# Patient Record
Sex: MALE | Race: WHITE | URBAN - NONMETROPOLITAN AREA
[De-identification: names, ages, dates, MRNs, and addresses within clinical notes are randomized per-mention and may not be internally consistent; named-entity substitution may affect disease eponyms.]

---

## 2022-10-17 ENCOUNTER — HOSPITAL ENCOUNTER (EMERGENCY)
Age: 22
Discharge: HOME | End: 2022-10-18
Attending: EMERGENCY MEDICINE
Payer: COMMERCIAL

## 2022-10-17 DIAGNOSIS — Z78.9 ALCOHOL USE: ICD-10-CM

## 2022-10-17 DIAGNOSIS — T14.91XA SUICIDE ATTEMPT (HCC): Primary | ICD-10-CM

## 2022-10-17 DIAGNOSIS — T58.92XA: ICD-10-CM

## 2022-10-17 LAB
AMPHETAMINE SCREEN URINE: NEGATIVE
BARBITURATE SCREEN URINE: NEGATIVE
BENZODIAZEPINE SCREEN, URINE: NEGATIVE
BUPRENORPHINE URINE: NEGATIVE
CANNABINOID SCREEN URINE: NEGATIVE
CARBOXYHEMOGLOBIN: 3.6 % (ref 0–5)
CARBOXYHEMOGLOBIN: 9.5 % (ref 0–5)
COCAINE METABOLITE, URINE: NEGATIVE
FIO2: 100
HCO3 VENOUS: 27.3 MMOL/L (ref 24–30)
HCT VFR BLD CALC: 50.2 % (ref 40.7–50.3)
HEMOGLOBIN: 17.6 G/DL (ref 13–17)
MCH RBC QN AUTO: 32.2 PG (ref 25.2–33.5)
MCHC RBC AUTO-ENTMCNC: 35.1 G/DL (ref 28.4–34.8)
MCV RBC AUTO: 91.8 FL (ref 82.6–102.9)
METHADONE SCREEN, URINE: NEGATIVE
METHAMPHETAMINE, URINE: NEGATIVE
METHEMOGLOBIN: 0.4 % (ref 0–1.9)
NRBC AUTOMATED: 0 PER 100 WBC
O2 DEVICE/FLOW/%: ABNORMAL
O2 SAT, VEN: 66 % (ref 60–85)
OPIATES, URINE: NEGATIVE
OXYCODONE SCREEN URINE: NEGATIVE
PATIENT TEMP: 37
PCO2, VEN: 44.1 MM HG (ref 39–55)
PDW BLD-RTO: 11.2 % (ref 11.8–14.4)
PH VENOUS: 7.41 (ref 7.32–7.42)
PHENCYCLIDINE, URINE: NEGATIVE
PLATELET # BLD: 332 K/UL (ref 138–453)
PMV BLD AUTO: 9 FL (ref 8.1–13.5)
PO2, VEN: 29.7 MM HG (ref 30–50)
POSITIVE BASE EXCESS, VEN: 2.1 MMOL/L (ref 0–2)
PROPOXYPHENE, URINE: NEGATIVE
RBC # BLD: 5.47 M/UL (ref 4.21–5.77)
TRICYCLIC ANTIDEPRESSANTS, UR: NEGATIVE
WBC # BLD: 7.5 K/UL (ref 3.5–11.3)

## 2022-10-17 PROCEDURE — G0480 DRUG TEST DEF 1-7 CLASSES: HCPCS

## 2022-10-17 PROCEDURE — 99285 EMERGENCY DEPT VISIT HI MDM: CPT

## 2022-10-17 PROCEDURE — 80306 DRUG TEST PRSMV INSTRMNT: CPT

## 2022-10-17 PROCEDURE — 36415 COLL VENOUS BLD VENIPUNCTURE: CPT

## 2022-10-17 PROCEDURE — 80143 DRUG ASSAY ACETAMINOPHEN: CPT

## 2022-10-17 PROCEDURE — 88740 TRANSCUTANEOUS CARBOXYHB: CPT

## 2022-10-17 PROCEDURE — 85027 COMPLETE CBC AUTOMATED: CPT

## 2022-10-17 PROCEDURE — 82375 ASSAY CARBOXYHB QUANT: CPT

## 2022-10-17 PROCEDURE — 82805 BLOOD GASES W/O2 SATURATION: CPT

## 2022-10-17 PROCEDURE — 93005 ELECTROCARDIOGRAM TRACING: CPT | Performed by: EMERGENCY MEDICINE

## 2022-10-17 PROCEDURE — 80179 DRUG ASSAY SALICYLATE: CPT

## 2022-10-17 PROCEDURE — 80307 DRUG TEST PRSMV CHEM ANLYZR: CPT

## 2022-10-17 ASSESSMENT — PATIENT HEALTH QUESTIONNAIRE - PHQ9: SUM OF ALL RESPONSES TO PHQ QUESTIONS 1-9: 20

## 2022-10-17 ASSESSMENT — PAIN DESCRIPTION - DESCRIPTORS: DESCRIPTORS: ACHING

## 2022-10-17 ASSESSMENT — PAIN DESCRIPTION - LOCATION: LOCATION: HEAD

## 2022-10-17 ASSESSMENT — PAIN SCALES - GENERAL: PAINLEVEL_OUTOF10: 2

## 2022-10-17 ASSESSMENT — PAIN - FUNCTIONAL ASSESSMENT: PAIN_FUNCTIONAL_ASSESSMENT: 0-10

## 2022-10-17 NOTE — ED PROVIDER NOTES
[facility] ED  Emergency Department        Pt Name: Vesta Kaufman  MRN: [id number]  [place] 2000  Date of evaluation: 10/17/22    CHIEF COMPLAINT       Chief Complaint   Patient presents with    Suicide Attempt     Pt reports SI with attempt prior to arrival. Reports sitting in garage with car running x 1-2 hours. States this happened around 1400. HISTORY OF PRESENT ILLNESS  (Location/Symptom, Timing/Onset, Context/Setting, Quality, Duration, ModifyingFactors, Severity.)      Vesta Kaufman is a 20 y.o. male who presents with suicide attempt. Patient states that he was sitting in a 1 car garage with the door shocked with the car running for 1 hour to 90- minutes. Was found by family. Patient does report crying. He does have a slight headache was brought in by family. Does have a history of suicidal ideation but no attempts previously is not on any medication. PAST MEDICAL / SURGICAL / SOCIAL / FAMILY HISTORY      has a past medical history of Allergic rhinitis and Laceration of right thumb.     has a past surgical history that includes Tonsillectomy.        Social History     Socioeconomic History    Marital status: Single     Spouse name: Not on file    Number of children: Not on file    Years of education: Not on file    Highest education level: Not on file   Occupational History    Not on file   Tobacco Use    Smoking status: Never    Smokeless tobacco: Not on file   Vaping Use    Vaping Use: Every day    Start date: 1/2/2019    Substances: Nicotine    Devices: Disposable   Substance and Sexual Activity    Alcohol use: Not on file    Drug use: Not on file    Sexual activity: Not on file   Other Topics Concern    Not on file   Social History Narrative    Not on file     Social Determinants of Health     Financial Resource Strain: Not on file   Food Insecurity: Not on file   Transportation Needs: Not on file   Physical Activity: Not on file   Stress: Not on file   Social Connections: Not on file   Intimate Partner Violence: Not on file   Housing Stability: Not on file       History reviewed. No pertinent family history. Allergies:  Patient has no known allergies. Home Medications:  Prior to Admission medications    Medication Sig Start Date End Date Taking? Authorizing Provider   gentamicin (GARAMYCIN) 0.3 % ophthalmic solution 2 drops to wound area(s) 2x / day  Patient not taking: Reported on 10/17/2022 12/29/15   Gomez Close, DPM       REVIEW OF SYSTEMS    (2-9 systems for level 4, 10 or more for level 5)      Review of Systems   Constitutional:  Negative for activity change, appetite change, fatigue and fever. HENT:  Negative for congestion, rhinorrhea and sore throat. Respiratory:  Negative for cough, shortness of breath and wheezing. Cardiovascular:  Negative for chest pain, palpitations and leg swelling. Gastrointestinal:  Negative for abdominal distention, constipation, diarrhea, nausea and vomiting. Genitourinary:  Negative for decreased urine volume and dysuria. Skin:  Negative for rash. Neurological:  Negative for dizziness, weakness, light-headedness, numbness and headaches. Psychiatric/Behavioral:  Positive for suicidal ideas. PHYSICAL EXAM   (up to 7 for level 4, 8 or more for level 5)     INITIAL VITALS:   /71   Pulse 58   Temp 99.5 °F (37.5 °C) (Tympanic)   Resp 16   Ht 6' (1.829 m)   Wt 185 lb (83.9 kg)   SpO2 98%   BMI 25.09 kg/m²     Physical Exam  Constitutional:       General: He is not in acute distress. Appearance: Normal appearance. He is not ill-appearing. Comments: Patient is awake alert expresses suicidal ideation with intent, he is mentating appropriately, negative cycloometric testing. He does have some erythema noted to his superior and inferior eyelid and some conjunctival injection. Patient does report he has been crying   HENT:      Head: Normocephalic and atraumatic.    Eyes:      General: Right eye: No discharge. Left eye: No discharge. Extraocular Movements: Extraocular movements intact. Pupils: Pupils are equal, round, and reactive to light. Cardiovascular:      Rate and Rhythm: Normal rate. Pulmonary:      Effort: Pulmonary effort is normal. No respiratory distress. Musculoskeletal:      Right lower leg: No edema. Left lower leg: No edema. Neurological:      General: No focal deficit present. Mental Status: He is alert and oriented to person, place, and time. DIFFERENTIAL  DIAGNOSIS     Patient awake alert, does report suicidal attempt, with sitting in the garage. We will plan on cooximeter which was done which does show 11 initially we will plan on formal blood test.  Placed on nonrebreather and monitor for clearance. Patient will need admission to psychiatric facility once medically stable    PLAN (LABS / IMAGING / EKG):  Orders Placed This Encounter   Procedures    COVID-19, Rapid    CBC    Blood gas, venous    Carboxyhemoglobin    TOX SCR, BLD, ED    Drug screen multi urine    Carboxyhemoglobin    EKG 12 Lead    Insert peripheral IV       MEDICATIONS ORDERED:  No orders of the defined types were placed in this encounter. DIAGNOSTIC RESULTS / EMERGENCY DEPARTMENT COURSE / MDM     LABS:  Results for orders placed or performed during the hospital encounter of 10/17/22   COVID-19, Rapid    Specimen: Nasopharyngeal Swab   Result Value Ref Range    Specimen Description . NASOPHARYNGEAL SWAB     SARS-CoV-2, Rapid Not Detected Not Detected   CBC   Result Value Ref Range    WBC 7.5 3.5 - 11.3 k/uL    RBC 5.47 4.21 - 5.77 m/uL    Hemoglobin 17.6 (H) 13.0 - 17.0 g/dL    Hematocrit 50.2 40.7 - 50.3 %    MCV 91.8 82.6 - 102.9 fL    MCH 32.2 25.2 - 33.5 pg    MCHC 35.1 (H) 28.4 - 34.8 g/dL    RDW 11.2 (L) 11.8 - 14.4 %    Platelets 418 438 - 580 k/uL    MPV 9.0 8.1 - 13.5 fL    NRBC Automated 0.0 0.0 per 100 WBC   Blood gas, venous   Result Value Ref Range pH, De 7.410 7.32 - 7.42    pCO2, De 44.1 39 - 55 mm Hg    pO2, De 29.7 (L) 30.0 - 50.0 mm Hg    HCO3, Venous 27.3 24.0 - 30.0 mmol/L    Positive Base Excess, De 2.1 (H) 0.0 - 2.0 mmol/L    O2 Sat, De 66.0 60.0 - 85.0 %    Carboxyhemoglobin 9.5 (H) 0.0 - 5.0 %    Methemoglobin 0.4 0.0 - 1.9 %    Pt Temp 37.0     O2 Device/Flow/% O2 Mask     FIO2 100    TOX SCR, BLD, ED   Result Value Ref Range    Acetaminophen Level <5 (L) 10 - 30 ug/mL    Ethanol 76 (H) <10 mg/dL    Ethanol percent 0.076 (H) <9.274 %    Salicylate Lvl <1 (L) 3 - 10 mg/dL    Toxic Tricyclic Sc,Blood PENDING NEGATIVE   Drug screen multi urine   Result Value Ref Range    Amphetamine Screen, Ur NEGATIVE NEGATIVE    Barbiturate Screen, Ur NEGATIVE NEGATIVE    Benzodiazepine Screen, Urine NEGATIVE     Cocaine Metabolite, Urine NEGATIVE NEGATIVE    Methadone Screen, Urine NEGATIVE NEGATIVE    Opiates, Urine NEGATIVE NEGATIVE    Phencyclidine, Urine NEGATIVE NEGATIVE    Propoxyphene, Urine NEGATIVE NEGATIVE    Cannabinoid Scrn, Ur NEGATIVE NEGATIVE    Oxycodone Screen, Ur NEGATIVE NEGATIVE    Methamphetamine, Urine NEGATIVE NEGATIVE    Tricyclic Antidepressants, Urine NEGATIVE NEGATIVE    Buprenorphine Urine NEGATIVE NEGATIVE   Carboxyhemoglobin   Result Value Ref Range    Carboxyhemoglobin 3.6 0 - 5 %   EKG 12 Lead   Result Value Ref Range    Ventricular Rate 87 BPM    Atrial Rate 87 BPM    P-R Interval 154 ms    QRS Duration 88 ms    Q-T Interval 352 ms    QTc Calculation (Bazett) 423 ms    P Axis 69 degrees    R Axis 70 degrees    T Axis 44 degrees       IMPRESSION: carbon monoxide posioning  Suicide attempt      EMERGENCY DEPARTMENT COURSE:  Patient medically stable for transfer to psychiatric facility, pink slip applied. Carbon monoxide level down to 6. Patient signed out to oncoming physician. FINAL IMPRESSION      1. Suicide attempt ([facility] [address]    2. Toxic effect of carbon monoxide, intentional self-harm, initial encounter ([facility] [address]    3. Alcohol use          DISPOSITION / PLAN     DISPOSITION Decision To Transfer 10/17/2022 07:24:25 PM      PATIENT REFERRED TO:  No follow-up provider specified.     DISCHARGE MEDICATIONS:  Discharge Medication List as of 10/18/2022  4:14 AM          Omaira Gilmore DO  7:55 AM    Attending Emergency Physician  [address] [place] [address] ED    (Please note that portions of this note were completed with a voice recognition program.  Effortswere made to edit the dictations but occasionally words are mis-transcribed.)             Shirley Fritz DO  10/18/22 5217

## 2022-10-18 ENCOUNTER — HOSPITAL ENCOUNTER (INPATIENT)
Age: 22
LOS: 3 days | Discharge: HOME | DRG: 885 | End: 2022-10-21
Attending: PSYCHIATRY & NEUROLOGY | Admitting: PSYCHIATRY & NEUROLOGY
Payer: COMMERCIAL

## 2022-10-18 VITALS
BODY MASS INDEX: 25.06 KG/M2 | HEART RATE: 58 BPM | HEIGHT: 72 IN | OXYGEN SATURATION: 98 % | WEIGHT: 185 LBS | DIASTOLIC BLOOD PRESSURE: 71 MMHG | TEMPERATURE: 99.5 F | RESPIRATION RATE: 16 BRPM | SYSTOLIC BLOOD PRESSURE: 110 MMHG

## 2022-10-18 PROBLEM — R45.851 DEPRESSION WITH SUICIDAL IDEATION: Status: ACTIVE | Noted: 2022-10-18

## 2022-10-18 PROBLEM — F32.2 MAJOR DEPRESSIVE DISORDER, SINGLE EPISODE, SEVERE WITHOUT PSYCHOTIC FEATURES (HCC): Status: ACTIVE | Noted: 2022-10-18

## 2022-10-18 PROBLEM — F32.A DEPRESSION WITH SUICIDAL IDEATION: Status: ACTIVE | Noted: 2022-10-18

## 2022-10-18 LAB
ACETAMINOPHEN LEVEL: <5 UG/ML (ref 10–30)
ALBUMIN SERPL-MCNC: 4.9 G/DL (ref 3.5–5.2)
ALP BLD-CCNC: 95 U/L (ref 40–129)
ALT SERPL-CCNC: 25 U/L (ref 5–41)
ANION GAP SERPL CALCULATED.3IONS-SCNC: 14 MMOL/L (ref 9–17)
AST SERPL-CCNC: 19 U/L
BILIRUB SERPL-MCNC: 1.7 MG/DL (ref 0.3–1.2)
BUN BLDV-MCNC: 9 MG/DL (ref 6–20)
CALCIUM SERPL-MCNC: 9.1 MG/DL (ref 8.6–10.4)
CHLORIDE BLD-SCNC: 101 MMOL/L (ref 98–107)
CO2: 23 MMOL/L (ref 20–31)
CREAT SERPL-MCNC: 0.91 MG/DL (ref 0.7–1.2)
EKG ATRIAL RATE: 87 BPM
EKG P AXIS: 69 DEGREES
EKG P-R INTERVAL: 154 MS
EKG Q-T INTERVAL: 352 MS
EKG QRS DURATION: 88 MS
EKG QTC CALCULATION (BAZETT): 423 MS
EKG R AXIS: 70 DEGREES
EKG T AXIS: 44 DEGREES
EKG VENTRICULAR RATE: 87 BPM
ETHANOL PERCENT: 0.08 %
ETHANOL: 76 MG/DL
GFR SERPL CREATININE-BSD FRML MDRD: >60 ML/MIN/1.73M2
GLUCOSE BLD-MCNC: 86 MG/DL (ref 70–99)
POTASSIUM SERPL-SCNC: 4.1 MMOL/L (ref 3.7–5.3)
SALICYLATE LEVEL: <1 MG/DL (ref 3–10)
SARS-COV-2, RAPID: NOT DETECTED
SODIUM BLD-SCNC: 138 MMOL/L (ref 135–144)
SPECIMEN DESCRIPTION: NORMAL
TOTAL PROTEIN: 7.1 G/DL (ref 6.4–8.3)
TOXIC TRICYCLIC SC,BLOOD: NEGATIVE
TSH SERPL DL<=0.05 MIU/L-ACNC: 1.33 UIU/ML (ref 0.3–5)

## 2022-10-18 PROCEDURE — 84443 ASSAY THYROID STIM HORMONE: CPT

## 2022-10-18 PROCEDURE — 99222 1ST HOSP IP/OBS MODERATE 55: CPT | Performed by: INTERNAL MEDICINE

## 2022-10-18 PROCEDURE — C9803 HOPD COVID-19 SPEC COLLECT: HCPCS

## 2022-10-18 PROCEDURE — 87635 SARS-COV-2 COVID-19 AMP PRB: CPT

## 2022-10-18 PROCEDURE — 80053 COMPREHEN METABOLIC PANEL: CPT

## 2022-10-18 PROCEDURE — 1240000000 HC EMOTIONAL WELLNESS R&B

## 2022-10-18 PROCEDURE — 93010 ELECTROCARDIOGRAM REPORT: CPT | Performed by: INTERNAL MEDICINE

## 2022-10-18 PROCEDURE — APPSS60 APP SPLIT SHARED TIME 46-60 MINUTES: Performed by: NURSE PRACTITIONER

## 2022-10-18 PROCEDURE — 36415 COLL VENOUS BLD VENIPUNCTURE: CPT

## 2022-10-18 RX ORDER — LORAZEPAM 1 MG/1
2 TABLET ORAL EVERY 4 HOURS PRN
Status: DISCONTINUED | OUTPATIENT
Start: 2022-10-18 | End: 2022-10-21 | Stop reason: HOSPADM

## 2022-10-18 RX ORDER — HYDROXYZINE 50 MG/1
50 TABLET, FILM COATED ORAL 3 TIMES DAILY PRN
Status: DISCONTINUED | OUTPATIENT
Start: 2022-10-18 | End: 2022-10-21 | Stop reason: HOSPADM

## 2022-10-18 RX ORDER — LORAZEPAM 2 MG/ML
2 INJECTION INTRAMUSCULAR EVERY 4 HOURS PRN
Status: DISCONTINUED | OUTPATIENT
Start: 2022-10-18 | End: 2022-10-21 | Stop reason: HOSPADM

## 2022-10-18 RX ORDER — TRAZODONE HYDROCHLORIDE 50 MG/1
50 TABLET ORAL NIGHTLY PRN
Status: DISCONTINUED | OUTPATIENT
Start: 2022-10-18 | End: 2022-10-21 | Stop reason: HOSPADM

## 2022-10-18 RX ORDER — POLYETHYLENE GLYCOL 3350 17 G/17G
17 POWDER, FOR SOLUTION ORAL DAILY PRN
Status: DISCONTINUED | OUTPATIENT
Start: 2022-10-18 | End: 2022-10-21 | Stop reason: HOSPADM

## 2022-10-18 RX ORDER — HALOPERIDOL 5 MG/ML
5 INJECTION INTRAMUSCULAR EVERY 4 HOURS PRN
Status: DISCONTINUED | OUTPATIENT
Start: 2022-10-18 | End: 2022-10-21 | Stop reason: HOSPADM

## 2022-10-18 RX ORDER — SERTRALINE HYDROCHLORIDE 25 MG/1
25 TABLET, FILM COATED ORAL DAILY
Status: DISCONTINUED | OUTPATIENT
Start: 2022-10-18 | End: 2022-10-21 | Stop reason: HOSPADM

## 2022-10-18 RX ORDER — DIPHENHYDRAMINE HYDROCHLORIDE 50 MG/ML
50 INJECTION INTRAMUSCULAR; INTRAVENOUS EVERY 4 HOURS PRN
Status: DISCONTINUED | OUTPATIENT
Start: 2022-10-18 | End: 2022-10-21 | Stop reason: HOSPADM

## 2022-10-18 RX ORDER — NICOTINE 21 MG/24HR
1 PATCH, TRANSDERMAL 24 HOURS TRANSDERMAL DAILY
Status: DISCONTINUED | OUTPATIENT
Start: 2022-10-18 | End: 2022-10-21 | Stop reason: HOSPADM

## 2022-10-18 RX ORDER — IBUPROFEN 400 MG/1
400 TABLET ORAL EVERY 6 HOURS PRN
Status: DISCONTINUED | OUTPATIENT
Start: 2022-10-18 | End: 2022-10-21 | Stop reason: HOSPADM

## 2022-10-18 RX ORDER — ACETAMINOPHEN 325 MG/1
650 TABLET ORAL EVERY 4 HOURS PRN
Status: DISCONTINUED | OUTPATIENT
Start: 2022-10-18 | End: 2022-10-21 | Stop reason: HOSPADM

## 2022-10-18 RX ORDER — MAGNESIUM HYDROXIDE/ALUMINUM HYDROXICE/SIMETHICONE 120; 1200; 1200 MG/30ML; MG/30ML; MG/30ML
30 SUSPENSION ORAL EVERY 6 HOURS PRN
Status: DISCONTINUED | OUTPATIENT
Start: 2022-10-18 | End: 2022-10-21 | Stop reason: HOSPADM

## 2022-10-18 RX ORDER — HALOPERIDOL 5 MG
5 TABLET ORAL EVERY 4 HOURS PRN
Status: DISCONTINUED | OUTPATIENT
Start: 2022-10-18 | End: 2022-10-21 | Stop reason: HOSPADM

## 2022-10-18 ASSESSMENT — ENCOUNTER SYMPTOMS
VOMITING: 0
CONSTIPATION: 0
NAUSEA: 0
SORE THROAT: 0
SHORTNESS OF BREATH: 0
DIARRHEA: 0
COUGH: 0
RHINORRHEA: 0
ABDOMINAL DISTENTION: 0
WHEEZING: 0

## 2022-10-18 ASSESSMENT — LIFESTYLE VARIABLES
HOW OFTEN DO YOU HAVE A DRINK CONTAINING ALCOHOL: MONTHLY OR LESS
HOW MANY STANDARD DRINKS CONTAINING ALCOHOL DO YOU HAVE ON A TYPICAL DAY: 3 OR 4

## 2022-10-18 ASSESSMENT — PAIN SCALES - GENERAL: PAINLEVEL_OUTOF10: 0

## 2022-10-18 ASSESSMENT — SLEEP AND FATIGUE QUESTIONNAIRES
DO YOU HAVE DIFFICULTY SLEEPING: NO
AVERAGE NUMBER OF SLEEP HOURS: 7
DO YOU USE A SLEEP AID: NO

## 2022-10-18 NOTE — PLAN OF CARE
[facility]  Initial Interdisciplinary Treatment Plan NO      Original treatment plan Date & Time: 10/18/2022  1304    Admission Type:  Admission Type: Involuntary (pinked Rolette)    Reason for admission:   Reason for Admission: suicide attempt    Estimated Length of Stay:  5-7days  Estimated Discharge Date: to be determined by physician    PATIENT STRENGTHS:  Patient Strengths:   Patient Strengths and Limitations:   Addictive Behavior: Addictive Behavior  In the Past 3 Months, Have You Felt or Has Someone Told You That You Have a Problem With  : None  Medical Problems:  Past Medical History:   Diagnosis Date    Allergic rhinitis     Laceration of right thumb 09/15/2015     Status EXAM:Mental Status and Behavioral Exam  Normal: No  Level of Assistance: Independent/Self  Facial Expression: Sad, Flat  Affect: Appropriate  Level of Consciousness: Alert  Frequency of Checks: 4 times per hour, close  Mood:Normal: No  Mood: Depressed, Anxious, Sad, Worthless, low self-esteem, Helpless  Motor Activity:Normal: No  Motor Activity: Decreased  Eye Contact: Fair  Observed Behavior: Cooperative, Guarded  Sexual Misconduct History: Current - no  Preception: [place] to person, [place] to time, [place] to place, [place] to situation  Attention:Normal: Yes  Thought Processes: Circumstantial  Thought Content:Normal: Yes  Depression Symptoms: Feelings of helplessness, Feelings of hopelessess  Anxiety Symptoms: Generalized  Kayla Symptoms: No problems reported or observed.   Hallucinations: None  Delusions: No  Memory:Normal: Yes  Insight and Judgment: No  Insight and Judgment: Poor judgment, Poor insight    EDUCATION:   Learner Progress Toward Treatment Goals: reviewed group plans and strategies for care    Method:group therapy, medication compliance, individualized assessments and care planning    Outcome: needs reinforcement    PATIENT GOALS: to be discussed with patient within 72 hours    PLAN/TREATMENT RECOMMENDATIONS: continue group therapy , medications compliance, goal setting, individualized assessments and care, continue to monitor pt on unit      SHORT-TERM GOALS: Patient declined to attend treatment team. No short term goals were discussed at this time. Time frame for Short-Term Goals: 5-7 days    LONG-TERM GOALS:Patient declined to attend treatment team. No long term goals were discussed at this time.   Time frame for Long-Term Goals: 6 months  Members Present in Team Meeting: See Signature Sheet    Kelly Cook RN

## 2022-10-18 NOTE — BH NOTE
Patient is ordered zoloft 25 mg oral daily. Patient refused medication, states \"nope I don't need that I refuse\". Writer provided education to patient regarding medication.

## 2022-10-18 NOTE — GROUP NOTE
Group Therapy Note    Date: 10/18/2022    Group Start Time: 1100  Group End Time: 3889  Group Topic: Cognitive Skills    [facility]    BRIANNA River    Cognitive Skills Group Note        Date: October 18, 2022 Start Time: 11am  End Time: 11:45am      Number of Participants in Group & Unit Census:  7/18    Topic: Communication, memory recall & concentration. Goal of Group: To improve concentration and communication skills through memory recall and collaborating with peers. Comments:     Patient did not participate in Cognitive Skills group, despite staff encouragement and explanation of benefits. Patient remain seclusive to self. Q15 minute safety checks maintained for patient safety and will continue to encourage patient to attend unit programming.         Signature:  BRIANNA River

## 2022-10-18 NOTE — BH NOTE
[facility]  Admission Note     Admission Type:   Admission Type: Involuntary (pinked Vinh)    Reason for admission:  Reason for Admission: suicide attempt      Addictive Behavior:   Addictive Behavior  In the Past 3 Months, Have You Felt or Has Someone Told You That You Have a Problem With  : None    Medical Problems:   Past Medical History:   Diagnosis Date    Allergic rhinitis     Laceration of right thumb 09/15/2015       Status EXAM:  Mental Status and Behavioral Exam  Normal: No  Level of Assistance: Independent/Self  Facial Expression: Sad, Flat  Affect: Appropriate  Level of Consciousness: Alert  Frequency of Checks: 4 times per hour, close  Mood:Normal: No  Mood: Depressed, Anxious, Sad, Worthless, low self-esteem, Helpless  Motor Activity:Normal: No  Motor Activity: Decreased  Eye Contact: Fair  Observed Behavior: Cooperative, Guarded  Sexual Misconduct History: Current - no  Preception: [place] to person, [place] to time, [place] to place, [place] to situation  Attention:Normal: Yes  Thought Processes: Circumstantial  Thought Content:Normal: Yes  Depression Symptoms: Feelings of helplessness, Feelings of hopelessess  Anxiety Symptoms: Generalized  Kayla Symptoms: No problems reported or observed.   Hallucinations: None  Delusions: No  Memory:Normal: Yes  Insight and Judgment: No  Insight and Judgment: Poor judgment, Poor insight    Tobacco Screening:  Practical Counseling, on admission, lizbeth X, if applicable and completed (first 3 are required if patient doesn't refuse:            ( x) Recognizing danger situations (included triggers and roadblocks)                    ( x) Coping skills (new ways to manage stress,relaxation techniques, changing routine, distraction)                                                           ( x) Basic information about quitting (benefits of quitting, techniques in how to quit, available resources  ( ) Referral for counseling faxed to [facility] ( ) Patient refused counseling  ( ) Patient has not smoked in the last 30 days    Metabolic Screening:    No results found for: LABA1C    No results found for: CHOL  No results found for: TRIG  No results found for: HDL  No components found for: LDLCAL  No results found for: LABVLDL      Body mass index is 25.09 kg/m². BP Readings from Last 2 Encounters:   10/18/22 (!) 128/91   10/18/22 110/71           Pt admitted with followings belongings:  Dental Appliances: None  Vision - Corrective Lenses: None  Hearing Aid: None  Jewelry: None  Body Piercings Removed: N/A  Clothing:  Footwear, Pants, Shirt, Sweater, Undergarments, Socks  Other Valuables: Money, Gareth Cook RN

## 2022-10-18 NOTE — BH NOTE
Patient given tobacco quitline number [phone] at this time, refusing to call at this time, states \" I just dont want to quit now\"- patient given information as to the dangers of long term tobacco use. Continue to reinforce the importance of tobacco cessation.

## 2022-10-18 NOTE — GROUP NOTE
Group Therapy Note    Date: 10/18/2022    Group Start Time: 0900  Group End Time: 0915  Group Topic: Community Meeting    [facility] MARVIN Patel [address] Meeting Group Note        Date: 10/18/2022  Start Time: 9am  End Time: 6386      Number of Participants in Group & Unit Census:  5/18        Goal of Group:To discuss daily goals      Comments:     Patient did not participate in [employer] group, despite staff encouragement and explanation of benefits. Patient remain seclusive to self. Q15 minute safety checks maintained for patient safety and will continue to encourage patient to attend unit programming.

## 2022-10-18 NOTE — PROGRESS NOTES
Behavioral Services  Medicare Certification Upon Admission    I certify that this patient's inpatient psychiatric hospital admission is medically necessary for:    [x] (1) Treatment which could reasonably be expected to improve this patient's condition,       [x] (2) Or for diagnostic study;     AND     [x](2) The inpatient psychiatric services are provided while the individual is under the care of a physician and are included in the individualized plan of care.     Estimated length of stay/service 3-5 days    Plan for post-hospital care hc    Electronically signed by Rita Hernandez MD on 10/18/2022 at 5:54 PM

## 2022-10-18 NOTE — ED NOTES
Pt accepted at [address] [place] [address]- waiting on bed assignment      Danny Hernandez  10/18/22 0117

## 2022-10-18 NOTE — PROGRESS NOTES
No recent fill history found. Patient denies any current home medications.      Nancie Boas PharmD Candidate [id number]

## 2022-10-18 NOTE — ED PROVIDER NOTES
I accepted this patient as a signout at 7 PM, the patient was medically cleared, his carboxyhemoglobin reduced to normal levels and he was accepted at [facility], DO  10/18/22 0542

## 2022-10-18 NOTE — CARE COORDINATION
[facility] Biopsychosocial Assessment    Current Level of Psychosocial Functioning     Independent X  Dependent    Minimal Assist     Comments:    Psychosocial High Risk Factors (check all that apply)    Unable to obtain meds   Chronic illness/pain    Substance abuse   Lack of Family Support   Financial stress   Isolation   Inadequate Community Resources  Suicide attempt(s) X  Not taking medications   Victim of crime   Developmental Delay  Unable to manage personal needs    Age 72 or older   Homeless  No transportation   Readmission within 30 days  Unemployment  Traumatic Event    Comments:   Psychiatric Advanced Directives: n/a    Family to Involve in Treatment: patient is agreeable to include his mother and father in his treatment    Sexual Orientation:  n/a    Patient Strengths: has housing, income, supportive family    Patient Barriers: minimizing of suicide attempt, isolative, discharge focused      Opiate Education Provided:  no      CMHC/mental health history: no history of mental health treatment    Plan of Care   medication management, group/individual therapies, family meetings, psycho -education, treatment team meetings to assist with stabilization    Initial Discharge Plan:  TBD      Clinical Summary:    Tello Díaz is a 21 y/o male admitted to [facility] after an interrupted suicide attempt where he attempted to run a generator in his garage. He shares he was interrupted by his sister and two other people. Patient was agreeable to assessment but extremely minimizing of the situation that occurred prior to admission. He is upset that he is hospitalized and states he misunderstood what hospitalization consisted of thinking he would be able to come and talk with a professional and then leave the same day.   Patient completed a request to leave today which was placed in his chart but there is no time recorded on the request.  He is not connected to anyone for mental health treatment and not interested in medication management. Patient's family arrived to the unit to provide support to patient but he refused to engage in any contact with them although he is agreeable to staff communicating to his mom regarding his care. Social work offered support to family and patient and will continue to engage in treatment as symptoms improve.

## 2022-10-18 NOTE — H&P
[facility] Internal Medicine    HISTORY AND PHYSICAL EXAMINATION/ CONSULT NOTE            Date:   10/18/2022  Patient name:  Cornelio Appiah  Date of admission:  10/18/2022  5:00 AM  MRN:   [id number]  Account:  [de-identified]  YOB: 2000  PCP:    Efraín Esposito MD  Room:   0219/0219-01  Code Status:    Full Code    Physician Requesting Consult: Bacilio Ulrich, *    Reason for Consult: History and physical, medical management    Chief Complaint:     No chief complaint on file. Medical management    History Obtained From:     Patient, EMR, nursing staff    History of Present Illness:     76-year-old with male admitted for depression with suicidal attempt patient tried to sit in a locked on car garage with the car running for over 1 hour. No significant chronic medical conditions, denies any chest pain palpitation cough difficulty breathing nausea vomiting diarrhea    Initially carboxyhemoglobin levels were elevated eventually normalized on nonrebreather      Past Medical History:     Past Medical History:   Diagnosis Date    Allergic rhinitis     Laceration of right thumb 09/15/2015        Past Surgical History:     Past Surgical History:   Procedure Laterality Date    TONSILLECTOMY          Medications Prior to Admission:     Prior to Admission medications    Not on File        Allergies:     Patient has no known allergies. Social History:     Tobacco:    reports that he has never smoked. He does not have any smokeless tobacco history on file. Alcohol:      has no history on file for alcohol use. Drug Use:  has no history on file for drug use. Family History:     No family history on file. Review of Systems:     Positive and Negative as described in HPI. Denies any shortness of breath or cough  Denies chest pain or palpitations  Denies abdominal pain, diarrhea vomiting  Denies any new numbness tremors or weakness.     10 point review of systems was negative other than mentioned above    Physical Exam:     /89   Pulse 65   Temp 98.2 °F (36.8 °C) (Temporal)   Resp 16   Ht 6' (1.829 m)   Wt 185 lb (83.9 kg)   BMI 25.09 kg/m²   Temp (24hrs), Av °F (37.2 °C), Min:98.2 °F (36.8 °C), Max:99.5 °F (37.5 °C)    No results for input(s): POCGLU in the last 72 hours. No intake or output data in the 24 hours ending 10/18/22 0943    General Appearance:  alert, well appearing, and in no acute distress  Head:  normocephalic, atraumatic. Eye: no icterus, redness, pupils equal and reactive, extraocular eye movements intact, conjunctiva clear  Ear: normal external ear, no discharge, hearing intact  Nose:  no drainage noted  Mouth: mucous membranes moist  Neck: supple, no carotid bruits, thyroid not palpable  Lungs: Bilateral equal air entry, clear to ausculation, no wheezing, rales or rhonchi, normal effort  Cardiovascular: normal rate, regular rhythm, no murmur, gallop, rub.   Abdomen: Soft, nontender, nondistended, normal bowel sounds, no hepatomegaly or splenomegaly  Neurologic: There are no new focal motor or sensory deficits, normal muscle tone and bulk, no abnormal sensation, normal speech, cranial nerves II through XII grossly intact  Skin: No gross lesions, rashes, bruising or bleeding on exposed skin area  Extremities:  No joint swelling, no pedal edema or calf pain with palpation      Investigations:      Laboratory Testing:  Recent Results (from the past 24 hour(s))   EKG 12 Lead    Collection Time: 10/17/22  6:08 PM   Result Value Ref Range    Ventricular Rate 87 BPM    Atrial Rate 87 BPM    P-R Interval 154 ms    QRS Duration 88 ms    Q-T Interval 352 ms    QTc Calculation (Bazett) 423 ms    P Axis 69 degrees    R Axis 70 degrees    T Axis 44 degrees   CBC    Collection Time: 10/17/22  6:10 PM   Result Value Ref Range    WBC 7.5 3.5 - 11.3 k/uL    RBC 5.47 4.21 - 5.77 m/uL    Hemoglobin 17.6 (H) 13.0 - 17.0 g/dL    Hematocrit 50.2 40.7 - 50.3 %    MCV 91.8 82.6 - 102.9 fL    MCH 32.2 25.2 - 33.5 pg    MCHC 35.1 (H) 28.4 - 34.8 g/dL    RDW 11.2 (L) 11.8 - 14.4 %    Platelets 397 164 - 694 k/uL    MPV 9.0 8.1 - 13.5 fL    NRBC Automated 0.0 0.0 per 100 WBC   Blood gas, venous    Collection Time: 10/17/22  6:10 PM   Result Value Ref Range    pH, De 7.410 7.32 - 7.42    pCO2, De 44.1 39 - 55 mm Hg    pO2, De 29.7 (L) 30.0 - 50.0 mm Hg    HCO3, Venous 27.3 24.0 - 30.0 mmol/L    Positive Base Excess, De 2.1 (H) 0.0 - 2.0 mmol/L    O2 Sat, De 66.0 60.0 - 85.0 %    Carboxyhemoglobin 9.5 (H) 0.0 - 5.0 %    Methemoglobin 0.4 0.0 - 1.9 %    Pt Temp 37.0     O2 Device/Flow/% O2 Mask     FIO2 100    TOX SCR, BLD, ED    Collection Time: 10/17/22  6:10 PM   Result Value Ref Range    Acetaminophen Level <5 (L) 10 - 30 ug/mL    Ethanol 76 (H) <10 mg/dL    Ethanol percent 0.076 (H) <1.479 %    Salicylate Lvl <1 (L) 3 - 10 mg/dL    Toxic Tricyclic Sc,Blood PENDING NEGATIVE   Carboxyhemoglobin    Collection Time: 10/17/22 10:05 PM   Result Value Ref Range    Carboxyhemoglobin 3.6 0 - 5 %   Drug screen multi urine    Collection Time: 10/17/22 11:01 PM   Result Value Ref Range    Amphetamine Screen, Ur NEGATIVE NEGATIVE    Barbiturate Screen, Ur NEGATIVE NEGATIVE    Benzodiazepine Screen, Urine NEGATIVE     Cocaine Metabolite, Urine NEGATIVE NEGATIVE    Methadone Screen, Urine NEGATIVE NEGATIVE    Opiates, Urine NEGATIVE NEGATIVE    Phencyclidine, Urine NEGATIVE NEGATIVE    Propoxyphene, Urine NEGATIVE NEGATIVE    Cannabinoid Scrn, Ur NEGATIVE NEGATIVE    Oxycodone Screen, Ur NEGATIVE NEGATIVE    Methamphetamine, Urine NEGATIVE NEGATIVE    Tricyclic Antidepressants, Urine NEGATIVE NEGATIVE    Buprenorphine Urine NEGATIVE NEGATIVE   COVID-19, Rapid    Collection Time: 10/18/22 12:14 AM    Specimen: Nasopharyngeal Swab   Result Value Ref Range    Specimen Description . NASOPHARYNGEAL SWAB     SARS-CoV-2, Rapid Not Detected Not Detected Imaging/Diagonstics:  Recent data reviewed    Assessment :      Primary Problem  Depression with suicidal ideation    Active Hospital Problems    Diagnosis Date Noted    Depression with suicidal ideation [F32. A, R45.851] 10/18/2022     Priority: Medium       Plan:     Reason for consult: General medical management     Depression with suicidal ideation-management per psychiatry  Labs and vitals reviewed-will add TSH and a CMP    DVT prophylaxis-not required, patient is mobile    Consultations:   [facility] MD Sher  10/18/2022  9:43 AM    Copy sent to Fei Alegria MD    Please note that this chart was generated using voice recognition Dragon dictation software. Although every effort was made to ensure the accuracy of this automated transcription, some errors in transcription may have occurred.

## 2022-10-18 NOTE — PLAN OF CARE
Problem: Self Harm/Suicidality  Goal: Will have no self-injury during hospital stay  Description: INTERVENTIONS:  1. Q 15 MINUTES: Routine safety checks  2. Q SHIFT & PRN: Assess risk to determine if routine checks are adequate to maintain patient safety  10/18/2022 1355 by Ricky Thomas RN  Outcome: Progressing    Patient denied suicidal ideations. Safe environment maintained. Safety checks every 15 minutes continued per unit policy. Patient informed to seek out staff if thoughts of self harm arise. Patient is observed to be isolative to room. Patient stated \"I just want to get the fuck out of here\". Patient is observed to be anxious. Writer attempted to talk to patient regarding reason for admission. Patient refused to talk with writer.

## 2022-10-18 NOTE — GROUP NOTE
Group Therapy Note    Date: 10/18/2022    Group Start Time: 1430  Group End Time: 1144  Group Topic: Music Therapy    SAY Lovell       Music Therapy Group Note        Date: 10/18/2022   Start Time: 1430  End Time: 4327      Number of Participants in Group & Unit Census:  8/16    Topic: Patients shared preferred music and dedicated songs to important people in their lives, and answered questions about these people as asked by this writer. Goal of Group:Goals to reflect on relationships; Increase self-expression; Increase sense of community; Increase socialization; Normalization of the environment      Comments:     Patient did not participate in Music Therapy group, despite staff encouragement and explanation of benefits. Patient remain seclusive to self. Q15 minute safety checks maintained for patient safety and will continue to encourage patient to attend unit programming.

## 2022-10-18 NOTE — H&P
Department of Psychiatry  Attending Physician Psychiatric Assessment     Reason for Admission to Psychiatric Unit:  Concerns about patient's safety in the community    CHIEF COMPLAINT:  Suicide attempt by carbon monoxide poisoning     History obtained from: Patient, electronic medical record          HISTORY OF PRESENT ILLNESS:    Harsh Macias is a 20 y.o. male without significant past medical history. Patient presented to the ED via family after being found sitting in his car while running in a closed garage. Patient stated he had been sitting in his car for an hour to 90 minutes. Patient has no history of previous suicide attempts and no significant psychiatric history. This is patient's first admission to [facility]. Patient was seen for initial evaluation today. He was resting in bed on approach but responded to verbal stimuli after a few attempts at waking him. Patient was initially guarded and evasive and is currently minimizing symptoms. Patient has poor insight into the severity of his actions. He does not appear ready to fully accept what has occurred. After a few minutes of conversation he relaxed and opened up more to writer. Patient shared that in August of this year his girlfriend, whom he shares a home with, wanted to end the exclusivity of their relationship. The patient does not want to end the relationship. They have continued to maintain a residence and he has been splitting his time between his parents house and his house. He reports that he and his former girlfriend continue to spend a lot of time together due to their living arrangement. They have not completely broken off the relationship. Patient reports that for the last 2 to 3 weeks he has noticed symptoms of depression and low mood. He found himself feeling more and more hopeless and worthless. He is able to fall asleep but has been experiencing early morning waking away for a few weeks now.   He also describes difficulty concentrating and has noticed a decrease in energy level. Appetite has remained adequate. Patient expresses regret for the suicide attempt and is having difficulty thinking about how this has affected his parents and former partner. He is not yet ready to speak to his parents nor does he want them to visit. Patient becomes tearful when stating that he just wants to go home and hug his dogs. Patient is very focused on discharge. Patient denies ever being linked with community mental health services and is antidepressant naïve and states he will refuse any medication offered. Patient is provided education regarding antidepressant medication. Patient denies a history of panic attacks but does express some social anxiety. He has difficulty around large crowds and in unfamiliar areas with people he does not know. He was encouraged to spend time in the day area even if it is just to watch TV. He is encouraged to attend groups, however patient states Derril Anger is way too out of my comfort zone\". He expresses he will most likely be staying in bed during his admission. When in large crowds patient feels restless and on edge, experiences muscle tension, and nervousness. Patient denies a history of janice, OCD, PTSD, and psychosis. He denies a history of childhood or adolescent abuse or trauma. He denies experiencing or witnessing significant trauma as an adult. He denies a history of self-mutilation or chronic feelings of emptiness. He denies fears of abandonment and reports that his relationships are usually stable. He denies a history of emotional outbursts or significant mood swings. Thoughts and speech are organized and linear. Patient makes no delusional or paranoid statements. Patient denies a history of illicit drug use. He does drink alcohol every few days or so and denies that he drinks to become intoxicated rather he just will have a few beers socially.   He does not believe that he has a problem with alcohol currently. Due to patient's minimizing of symptoms and poor insight into his actions patient requires hospitalization for safety and stabilization. History of head trauma: [] Yes [x] No    History of seizures: [] Yes [x] No    History of violence or aggression: [] Yes [x] No         PSYCHIATRIC HISTORY:  [] Yes [x] No    Never linked  0 lifetime suicide attempt(s) prior to yesterday  This is his first psychiatric hospital admission(s)    Home medication compliant [] Yes [] No N/A    Past psychiatric medications includes: N/A    Adverse reactions from psychotropic medications: [] Yes [x] No         Lifetime Psychiatric Review of Systems          Depression: Endorses     Anxiety: Endorses social anxiety     Panic Attacks: Denies     Kayla or Hypomania: Denies     Phobias: Large crowds     Obsessions and Compulsions: Denies     Body or Vocal Tics: Denies     Visual Hallucinations: Denies     Auditory Hallucinations: Denies     Delusions/Paranoia: Denies     PTSD: Denies    Past Medical History:        Diagnosis Date    Allergic rhinitis     Laceration of right thumb 09/15/2015       Past Surgical History:        Procedure Laterality Date    TONSILLECTOMY         Allergies:  Patient has no known allergies.          Social History:    Born in: [facility]; raised in [place], [place]  Family: Born to  parents; parents are alive and remained together; patient has 1 older sister and 1 younger sister; describes family as close and supportive; describes childhood as a loving and that all of his needs were met; denies a history of abuse  Highest Level of Education: High school graduate/trade school  Occupation:   Marital Status: Single  Children: None  Residence: Shares a home with former partner  Stressors: Recent break-up  Patient Assets/Supportive Factors: Supportive family; stable housing and income; willingness to seek treatment         DRUG USE HISTORY  Social History     Tobacco Use Smoking Status Never   Smokeless Tobacco Not on file     Social History     Substance and Sexual Activity   Alcohol Use None     Social History     Substance and Sexual Activity   Drug Use Not on file     10/17/2022: UDS negative; EtOH 0.076    Patient reports social drinking a couple of times a week  Patient reports daily nicotine vaping  Patient denies illicit drug use         LEGAL HISTORY:   HISTORY OF INCARCERATION: [] Yes [x] No    Family History:   No family history on file. Psychiatric Family History  Patient denies psychiatric family history. Suicides in family: [] Yes [x] No    Substance use in family: [] Yes [x] No         PHYSICAL EXAM:  Vitals:  /89   Pulse 65   Temp 98.2 °F (36.8 °C) (Temporal)   Resp 16   Ht 6' (1.829 m)   Wt 185 lb (83.9 kg)   BMI 25.09 kg/m²     LABS:  Labs reviewed: [x] Yes  Last EKG in EMR reviewed: [x] Yes          Review of Systems   Constitutional: Negative for chills and weight loss. HENT: Negative for ear pain and nosebleeds. Eyes: Negative for blurred vision and photophobia. Respiratory: Negative for cough, shortness of breath and wheezing. Cardiovascular: Negative for chest pain and palpitations. Gastrointestinal: Negative for abdominal pain, diarrhea and vomiting. Genitourinary: Negative for dysuria and urgency. Musculoskeletal: Negative for falls and joint pain. Skin: Negative for itching and rash. Neurological: Negative for tremors, seizures and weakness. Endo/Heme/Allergies: Does not bruise/bleed easily. Pain Scale (0 -10): 0    Physical Exam:   Constitutional:  Appears well-developed and well-nourished, no acute distress. HENT:   Head: Normocephalic and atraumatic. Eyes: Conjunctivae are normal. Right eye exhibits no discharge. Left eye exhibits no discharge. No scleral icterus. Neck: Normal range of motion. Neck supple. Pulmonary/Chest:  No respiratory distress or accessory muscle use, no wheezing.    Cardiac: Regular rate and rhythm. Abdominal: Soft. Non-tender. Exhibits no distension. Musculoskeletal: Normal range of motion. Exhibits no edema. Neurological: cranial nerves II-XII grossly in tact, normal gait and station. Skin: Skin is warm and dry. Patient is not diaphoretic. No erythema. Mental Status Examination:    Level of consciousness: Awake and alert  Appearance:  Appropriate attire, resting in bed, fair grooming and hygiene  Behavior/Motor: Approachable, calm  Attitude toward examiner:  Cooperative, attentive, minimal eye contact  Speech: Normal rate, volume, and depressed tone. Mood: Depressed  Affect: Mood congruent  Thought processes:  Goal directed, linear, coherent  Thought content: Improving suicidal ideation; unable to contract for safety in community              Denies homicidal ideations               Denies hallucinations              Denies delusions              Denies paranoia  Cognition:  Oriented to self, location, time, situation  Concentration: Clinically adequate  Memory: Intact  Insight & Judgment: Poor         DSM-5 Diagnosis    Principal Problem: Major depressive disorder, single episode, severe without psychotic features ([facility] [address]    Psychosocial and Contextual factors:  Financial   Occupational   Relationship X  Legal   Living situation X  Educational     Past Medical History:   Diagnosis Date    Allergic rhinitis     Laceration of right thumb 09/15/2015        HANDOFF  Continue inpatient psychiatric treatment. Home medications reviewed/verified: None  Offered/ordered Zoloft 25 mg by mouth daily; patient refused medication  Patient highly encouraged to consider community mental health support for CBT  Problem list updated. Medications as determined by attending physician  Encourage participation in groups and milieu. Probable discharge is to be determined by MD  Follow-up daily while inpatient.      CONSULTS [x] Yes [] No  Internal medicine for medical H&P    Risk Management: close watch per standard protocol    Psychotherapy: participation in milieu and group and individual sessions with Attending Physician,  and Physician Assistant/CNP    Estimated length of stay:  2-14 days    GENERAL PATIENT/FAMILY EDUCATION  Patient will understand basic signs and symptoms, patient will understand benefits/risks and potential side effects from proposed medications, and patient will understand their role in recovery. Family is active in patient's care. Patient assets that may be helpful during treatment include: Intent to participate and engage in treatment, sufficient fund of knowledge and intellect to understand and utilize treatments. Goals:    1) Remission of suicidal ideation. 2) Stabilization of symptoms prior to discharge. 3) Establish efficacy and tolerability of medications. Behavioral Services  Medicare Certification     Admission Day 1  I certify that this patient's inpatient psychiatric hospital admission is medically necessary for:    x (1) treatment which could reasonably be expected to improve this patient's condition, or    x (2) diagnostic study or its equivalent. Time Spent: 60 minutes    Isidro Steele is a 20 y.o. male being evaluated face to face    --FABIO Driscoll CNP on 10/18/2022 at 11:30 AM    An electronic signature was used to authenticate this note. Psychiatry Attending Attestation     I independently saw and evaluated the patient. I reviewed the Advance Practice Provider's documentation above. Any additional comments or changes to the Advance Practice Provider's documentation are stated below otherwise agree with assessment. Patient is a 76-year-old male with history of depression not on any psychotropic medications admitted following a suicide attempt by trying to kill self by carbon monoxide poisoning. Family found him in the garage. Patient did report that he was trying to kill himself. Reports stressors as ongoing conflict with his partner for last 2 weeks. Reports some feelings of helplessness and hopelessness. Reports having some trouble falling asleep and staying asleep. Mentions that he is able to focus and concentrate at work however has been dealing with intense feelings of worthlessness. Offered Zoloft and he was very hesitant. Extremely discharge focused. Minimizing the attempt that led to his admission. PLAN  Will offer Zoloft to help with mood  Attempt to develop insight  Psycho-education conducted. Supportive Therapy conducted.   Probable discharge is tbd  Follow-up TBD    Electronically signed by Jf Pierce MD on 10/18/22 at 5:53 PM EDT

## 2022-10-19 PROCEDURE — 1240000000 HC EMOTIONAL WELLNESS R&B

## 2022-10-19 PROCEDURE — APPSS30 APP SPLIT SHARED TIME 16-30 MINUTES

## 2022-10-19 NOTE — GROUP NOTE
Group Therapy Note    Date: 10/19/2022    Group Start Time: 0900  Group End Time: 1188  Group Topic: Group Documentation    [facility]    Carolin Tesfaye RN        Group Therapy Note    Attendees: 6/17         Patient's Goal:  Set up appointments with a therapist in [place] with mom's help    Notes:  Goals group    Status After Intervention:  Improved    Participation Level:  Active Listener and Interactive    Participation Quality: Appropriate, Attentive, Sharing, and Supportive      Speech:  normal      Thought Process/Content: Logical  Linear      Affective Functioning: Congruent      Mood: elevated      Level of consciousness:  Alert, Oriented x4, and Attentive      Response to Learning: Able to verbalize current knowledge/experience, Able to verbalize/acknowledge new learning, Able to retain information, and Capable of insight      Endings: None Reported    Modes of Intervention: Support, Socialization, and Exploration      Discipline Responsible: Behavorial Health Tech      Signature:  Carolin Tesfaye RN

## 2022-10-19 NOTE — PROGRESS NOTES
Daily Progress Note  10/19/2022    Patient Name: Orlena Fabry    CHIEF COMPLAINT: Suicide attempt by carbon monoxide poisoning         SUBJECTIVE:      Patient is seen today for a follow up assessment. Abigail Mohamud is agreeable to assessment and unit sensory room today. He has not been compliant with scheduled Zoloft. He remains behaviorally in control and has not required emergency medications in the past 24 hours. Abigail Mohamud reports improvement in both depression and anxiety today. He reports that he slept well last night and denies any issues with his appetite. This writer asked patient to revisit events leading up to hospitalization and Abigail Mohamud does report that he attempted to kill himself due to recent life stressors. He seems to be very minimizing of these events as well as his current symptoms. He reports improvement in suicidal ideation however seems to lack significant insight into the seriousness of his actions. When asked why he declined his Zoloft he reports that he feels as if he does not need this medication at this point. He states, Tanyatony Florian is not to say I may not need it later. \"  This writer educated patient on the way SSRIs work and that they need time to build up in the system and patient continues to decline medications. He states that he would like to trial therapy first before starting medications. Abigail Mohamud appears to lack significant insight into his mental illness and the seriousness of his actions. At this time he would continue to be unsafe out of the hospital due to his significant lack of insight. He continues to warrant further inpatient hospitalization for safety and stability.     Appetite:  [x] Normal/Adequate/Unchanged  [] Increased  [] Decreased      Sleep:       [x] Normal/Adequate/Unchanged  [] Fair  [] Poor      Group Attendance on Unit:   [x] Yes  [] Selectively    [] No    Medication Side Effects:  Patient is not compliant with scheduled medication         Mental Status Exam  Level of consciousness: Alert and awake. Appearance: Appropriate attire for setting, seated in chair, with fair  grooming and hygiene. Behavior/Motor: Approachable, calm  Attitude toward examiner: Cooperative, attentive, fair eye contact. Speech: Normal rate, normal volume, normal tone. Mood:  Patient reports \"pretty good\". Affect: Euthymic  Thought processes: Linear and coherent. Thought content: Denies homicidal ideation. Suicidal Ideation: Reports improvement in suicidal ideations  Delusions: No evidence of delusions. Denies paranoia. Perceptual Disturbance: Patient does not appear to be responding to internal stimuli. Denies auditory hallucinations. Denies visual hallucinations. Cognition: Oriented to self, location, time, and situation. Memory: Intact. Insight & Judgement: Poor. Data   height is 6' (1.829 m) and weight is 185 lb (83.9 kg). His temporal temperature is 98.1 °F (36.7 °C). His blood pressure is 119/65 and his pulse is 61. His respiration is 14. Labs:   Admission on 10/18/2022   Component Date Value Ref Range Status    TSH 10/18/2022 1.33  0.30 - 5.00 uIU/mL Final    Glucose 10/18/2022 86  70 - 99 mg/dL Final    BUN 10/18/2022 9  6 - 20 mg/dL Final    Creatinine 10/18/2022 0.91  0.70 - 1.20 mg/dL Final    Est, Glom Filt Rate 10/18/2022 >60  >60 mL/min/1.73m2 Final    Comment:       Effective Oct 3, 2022        These results are not intended for use in patients <25years of age. eGFR results are calculated without a race factor using the 2021 CKD-EPI equation. Careful clinical correlation is recommended, particularly when comparing to results   calculated using previous equations. The CKD-EPI equation is less accurate in patients with extremes of muscle mass, extra-renal   metabolism of creatine, excessive creatine ingestion, or following therapy that affects   renal tubular secretion.       Calcium 10/18/2022 9.1  8.6 - 10.4 mg/dL Final    Sodium 10/18/2022 138  135 - 144 mmol/L Final    Potassium 10/18/2022 4.1  3.7 - 5.3 mmol/L Final    Chloride 10/18/2022 101  98 - 107 mmol/L Final    CO2 10/18/2022 23  20 - 31 mmol/L Final    Anion Gap 10/18/2022 14  9 - 17 mmol/L Final    Alkaline Phosphatase 10/18/2022 95  40 - 129 U/L Final    ALT 10/18/2022 25  5 - 41 U/L Final    AST 10/18/2022 19  <40 U/L Final    Total Bilirubin 10/18/2022 1.7 (A)  0.3 - 1.2 mg/dL Final    Total Protein 10/18/2022 7.1  6.4 - 8.3 g/dL Final    Albumin 10/18/2022 4.9  3.5 - 5.2 g/dL Final   Admission on 10/17/2022, Discharged on 10/18/2022   Component Date Value Ref Range Status    WBC 10/17/2022 7.5  3.5 - 11.3 k/uL Final    RBC 10/17/2022 5.47  4.21 - 5.77 m/uL Final    Hemoglobin 10/17/2022 17.6 (A)  13.0 - 17.0 g/dL Final    Hematocrit 10/17/2022 50.2  40.7 - 50.3 % Final    MCV 10/17/2022 91.8  82.6 - 102.9 fL Final    MCH 10/17/2022 32.2  25.2 - 33.5 pg Final    MCHC 10/17/2022 35.1 (A)  28.4 - 34.8 g/dL Final    RDW 10/17/2022 11.2 (A)  11.8 - 14.4 % Final    Platelets 46/03/7189 332  138 - 453 k/uL Final    MPV 10/17/2022 9.0  8.1 - 13.5 fL Final    NRBC Automated 10/17/2022 0.0  0.0 per 100 WBC Final    pH, De 10/17/2022 7.410  7.32 - 7.42 Final    pCO2, De 10/17/2022 44.1  39 - 55 mm Hg Final    pO2, De 10/17/2022 29.7 (A)  30.0 - 50.0 mm Hg Final    HCO3, Venous 10/17/2022 27.3  24.0 - 30.0 mmol/L Final    Positive Base Excess, De 10/17/2022 2.1 (A)  0.0 - 2.0 mmol/L Final    O2 Sat, De 10/17/2022 66.0  60.0 - 85.0 % Final    Carboxyhemoglobin 10/17/2022 9.5 (A)  0.0 - 5.0 % Final    Comment:       Reference Range:  Non-Smokers     0-2%  Average Smoker  2-4%  Heavy Smoker    <10%            Methemoglobin 10/17/2022 0.4  0.0 - 1.9 % Final    Pt Temp 10/17/2022 37.0   Final    O2 Device/Flow/% 10/17/2022 O2 Mask   Final    NONREBREATHER    FIO2 10/17/2022 100   Final    Acetaminophen Level 10/17/2022 <5 (A)  10 - 30 ug/mL Final    Ethanol 10/17/2022 76 (A)  <10 mg/dL Final    Ethanol percent 10/17/2022 0.076 (A)  <7.123 % Final    Salicylate Lvl 37/88/9702 <1 (A)  3 - 10 mg/dL Final    Toxic Tricyclic Sc,Blood 22/44/7106 NEGATIVE  NEGATIVE Final    Ventricular Rate 10/17/2022 87  BPM Final    Atrial Rate 10/17/2022 87  BPM Final    P-R Interval 10/17/2022 154  ms Final    QRS Duration 10/17/2022 88  ms Final    Q-T Interval 10/17/2022 352  ms Final    QTc Calculation (Bazett) 10/17/2022 423  ms Final    P Axis 10/17/2022 69  degrees Final    R Axis 10/17/2022 70  degrees Final    T Axis 10/17/2022 44  degrees Final    Amphetamine Screen, Ur 10/17/2022 NEGATIVE  NEGATIVE Final    Barbiturate Screen, Ur 10/17/2022 NEGATIVE  NEGATIVE Final    Benzodiazepine Screen, Urine 10/17/2022 NEGATIVE   Final    Cocaine Metabolite, Urine 10/17/2022 NEGATIVE  NEGATIVE Final    Methadone Screen, Urine 10/17/2022 NEGATIVE  NEGATIVE Final    Opiates, Urine 10/17/2022 NEGATIVE  NEGATIVE Final    Phencyclidine, Urine 10/17/2022 NEGATIVE  NEGATIVE Final    Propoxyphene, Urine 10/17/2022 NEGATIVE  NEGATIVE Final    Cannabinoid Scrn, Ur 10/17/2022 NEGATIVE  NEGATIVE Final    Oxycodone Screen, Ur 10/17/2022 NEGATIVE  NEGATIVE Final    Methamphetamine, Urine 10/17/2022 NEGATIVE  NEGATIVE Final    Tricyclic Antidepressants, Urine 10/17/2022 NEGATIVE  NEGATIVE Final    Comment: Drug screen results are to be used for medical purposes only. All positive results are   unconfirmed. Testing for employment or legal uses should be sent to a reference laboratory   for confirmation. Buprenorphine Urine 10/17/2022 NEGATIVE  NEGATIVE Final    Carboxyhemoglobin 10/17/2022 3.6  0 - 5 % Final    Comment:       Reference Range:  Non-Smokers     0-2%  Average Smoker  2-4%  Heavy Smoker    <10%            Specimen Description 10/18/2022 . NASOPHARYNGEAL SWAB   Final    SARS-CoV-2, Rapid 10/18/2022 Not Detected  Not Detected Final    Comment:       Rapid NAAT:  The specimen is NEGATIVE for SARS-CoV-2, the novel coronavirus associated with COVID-19. The ID NOW COVID-19 assay is designed to detect the virus that causes COVID-19 in patients   with signs and symptoms of infection who are suspected of COVID-19. An individual without symptoms of COVID-19 and who is not shedding SARS-CoV-2 virus would   expect to have a negative (not detected) result in this assay. Negative results should be treated as presumptive and, if inconsistent with clinical signs   and symptoms or necessary for patient management,  should be tested with an alternative molecular assay. Negative results do not preclude   SARS-CoV-2 infection and   should not be used as the sole basis for patient management decisions. Fact sheet for Healthcare Providers: WorkplaceHistory.com.br  Fact sheet for Patients: [facility]          Methodology: Isothermal Nucleic Acid Amplification           Reviewed patient's current plan of care and vital signs with nursing staff.     Labs reviewed: [x] Yes  Last EKG in EMR reviewed: [x] Yes  QTc: 423    Medications  Current Facility-Administered Medications: acetaminophen (TYLENOL) tablet 650 mg, 650 mg, Oral, Q4H PRN  aluminum & magnesium hydroxide-simethicone (MAALOX) 200-200-20 MG/5ML suspension 30 mL, 30 mL, Oral, Q6H PRN  hydrOXYzine HCl (ATARAX) tablet 50 mg, 50 mg, Oral, TID PRN  ibuprofen (ADVIL;MOTRIN) tablet 400 mg, 400 mg, Oral, Q6H PRN  nicotine (NICODERM CQ) 14 MG/24HR 1 patch, 1 patch, TransDERmal, Daily  polyethylene glycol (GLYCOLAX) packet 17 g, 17 g, Oral, Daily PRN  traZODone (DESYREL) tablet 50 mg, 50 mg, Oral, Nightly PRN  LORazepam (ATIVAN) tablet 2 mg, 2 mg, Oral, Q4H PRN **AND** haloperidol (HALDOL) tablet 5 mg, 5 mg, Oral, Q4H PRN  LORazepam (ATIVAN) injection 2 mg, 2 mg, IntraMUSCular, Q4H PRN **AND** haloperidol lactate (HALDOL) injection 5 mg, 5 mg, IntraMUSCular, Q4H PRN **AND** diphenhydrAMINE (BENADRYL) injection 50 mg, 50 mg, IntraMUSCular, Q4H PRN  sertraline (ZOLOFT) tablet 25 mg, 25 mg, Oral, Daily    ASSESSMENT  Major depressive disorder, single episode, severe without psychotic features ([facility] [address]         HANDOFF  Patient symptoms are: Remains Unstable. Medications as determined by attending physician  Continue to encourage compliance  Monitor need and frequency of PRN medications. Encourage participation in groups and milieu. Probable discharge is to be determined by MD.     Electronically signed by FABIO Ramirez CNP on 10/19/2022 at 3:48 PM    **This report has been created using voice recognition software. It may contain minor errors which are inherent in voice recognition technology. **                                         Psychiatry Attending Attestation     I independently saw and evaluated the patient. I reviewed the Advance Practice Provider's documentation above. Any additional comments or changes to the Advance Practice Provider's documentation are stated below otherwise agree with assessment. Patient refused to take his Zoloft. Continues to minimize the incident that led to his admission. He did agree that he needs to see a therapist.  Encourage medication compliance and will have a family meeting with his mother possibly tomorrow. PLAN  Patient s symptoms show no change  Encourage medication compliance  Attempt to develop insight  Psycho-education conducted. Supportive Therapy conducted.   Probable discharge is tbd  Follow-up TBD    Electronically signed by Sharlene Carney MD on 10/19/22 at 6:06 PM EDT

## 2022-10-19 NOTE — GROUP NOTE
Group Therapy Note    Date: 10/19/2022    Group Start Time: 1100  Group End Time: 8639  Group Topic: Music Therapy    [facility]    Yoana Ballesteros        Group Therapy Note    Attendees: 8/16     Patient's Goal:  Patients shared preferred music and offered advice based on the themes expressed within their songs. Goals to engage in peer support; Increase socialization; Increase sense of community; Normalization of the environment    Notes:  Patient attended and participated in group having positive interactions with peers and staff. Patient shared music and advice expressing \"sometimes you have to do things for yourself regardless of what other people think about it\"    Status After Intervention:  Improved    Participation Level:  Active Listener and Interactive    Participation Quality: Appropriate, Attentive, Sharing, and Supportive      Speech:  normal      Thought Process/Content: Logical  Linear      Affective Functioning: Congruent      Mood: euthymic      Level of consciousness:  Alert and Attentive      Response to Learning: Able to verbalize current knowledge/experience, Capable of insight, and Progressing to goal      Endings: None Reported    Modes of Intervention: Support, Socialization, Exploration, Activity, Media, and Reality-testing      Discipline Responsible: Psychoeducational Specialist      Signature:  Yoana Ballesteros

## 2022-10-19 NOTE — PROGRESS NOTES
Pharmacy Med Education Group Note    Date: 10/19/22  Start Time: 1430  End Time: 0441    Number Participants in Group:  7    Goal:  Patient will demonstrate an understanding of the medications intended purpose and possible adverse effects  Topic: [place] for Pharmacy Med Ed Group    Discipline Responsible:     OT  AT  [facility].  RT     X Other       Participation Level:     None  Minimal      X Active Listener    X Interactive    Monopolizing         Participation Quality:    X Appropriate  Inappropriate     X       Attentive        Intrusive          Sharing        Resistant          Supportive        Lethargic       Affective:     X Congruent  Incongruent  Blunted  Flat    Constricted  Anxious  Elated  Angry    Labile  Depressed  Other         Cognitive:    X Alert  Oriented PPTP     Concentration   X G  F  P   Attention Span   X G  F  P   Short-Term Memory   X G  F  P   Long-Term Memory  G  F  P   ProblemSolving/  Decision Making  G  F  P   Ability to Process  Information   X G  F  P      Contributing Factors             Delusional             Hallucinating             Flight of Ideas             Other:       Modes of Intervention:    X Education   X Support  Exploration    Clarifying  Problem Solving  Confrontation    Socialization  Limit Setting  Reality Testing    Activity  Movement  Media    Other:            Response to Learning:    X Able to verbalize current knowledge/experience    Able to verbalize/acknowledge new learning    Able to retain information    Capable of insight    Able to change behavior    Progressing to goal    Other:        Comments:      Dawit Malagon PharmD Candidate [id number]

## 2022-10-19 NOTE — PLAN OF CARE
Problem: Self Harm/Suicidality  Goal: Will have no self-injury during hospital stay  Description: INTERVENTIONS:  1. Q 30 MINUTES: Routine safety checks  2. Q SHIFT & PRN: Assess risk to determine if routine checks are adequate to maintain patient safety  10/18/2022 2127 by Reji Olea  Outcome: Progressing  Note: Patient remains free from self harm at this time. Pt denies thoughts of self harm and is agreeable to seeking out should thoughts of self harm arise. Safe environment maintained. Q15 minute checks for safety cont per unit policy. Will cont to monitor for safety and provides support and reassurance as needed. Isolative to room, flat affect. Had no scheduled night medications.

## 2022-10-19 NOTE — PLAN OF CARE
Problem: Self Harm/Suicidality  Goal: Will have no self-injury during hospital stay  Description: INTERVENTIONS:  1. Q 15 MINUTES: Routine safety checks  2. Q SHIFT & PRN: Assess risk to determine if routine checks are adequate to maintain patient safety  Outcome: Progressing   1:1 with pt x ten minutes. Pt encouraged to attend unit programming and interact with peers and staff. Pt also encouraged to tend to hygiene and ADLs. Pt encouraged to discuss feelings with staff and feedback and reassurance provided. Pt denies thoughts of self harm and is agreeable to seeking out should thoughts of self harm arise. Safe environment maintained. Q15 minute checks for safety cont per unit policy. Will cont to monitor for safety and provides support and reassurance as needed. Pt refused medications. States he does not take medication. Did attend select groups. Social with peers.

## 2022-10-20 PROCEDURE — APPSS30 APP SPLIT SHARED TIME 16-30 MINUTES

## 2022-10-20 PROCEDURE — 1240000000 HC EMOTIONAL WELLNESS R&B

## 2022-10-20 NOTE — CARE COORDINATION
Social work filed documents with [employer] via [employer] secure link today.

## 2022-10-20 NOTE — PLAN OF CARE
Problem: Self Harm/Suicidality  Goal: Will have no self-injury during hospital stay  Description: INTERVENTIONS:  1. Q 30 MINUTES: Routine safety checks  2. Q SHIFT & PRN: Assess risk to determine if routine checks are adequate to maintain patient safety  10/19/2022 2227 by Arslan Oglesby RN  Outcome: Progressing  Note: Patient remains absent of self injury during this shift. Patient is focused on discharge. He is polite and cooperative. Staff maintains Q 15 minute safety checks.

## 2022-10-20 NOTE — GROUP NOTE
Group Therapy Note    Date: 10/20/2022    Group Start Time: 1000  Group End Time: 1030  Group Topic: Psychotherapy    [facility]    Pricilla Painting        Group Therapy Note    Attendees3/16       Patient's Goal:PT will demonstrate increased interpersonal interaction and a clear understanding on multiple types of coping skills relating to the here-and-now therapeutic practice. Notes:Patient is making progress, AEB participating in group discussion, actively listening, and supporting other group members. PT participates in group and encourages others to participate     Status After Intervention:  Improved    Participation Level: Active Listener and Interactive    Participation Quality: Appropriate, Attentive, and Sharing      Speech:  normal      Thought Process/Content: Logical      Affective Functioning: Flat      Mood: depressed      Level of consciousness:  Alert, Oriented x4, and Attentive      Response to Learning: Able to verbalize/acknowledge new learning and Progressing to goal      Endings: None Reported    Modes of Intervention: Support, Socialization, Exploration, Clarifying, and Problem-solving      Discipline Responsible: /Counselor      Signature:   Pricilla Painting

## 2022-10-20 NOTE — PROGRESS NOTES
10/20/22 1155   Encounter Summary   Encounter Overview/Reason   Encounter   Service Provided For: Patient   Referral/Consult From: Rounding   Last Encounter  10/20/22   Complexity of Encounter Low   Begin Time 1000   End Time  1015   Total Time Calculated 15 min   Rituals, Rites and Sacraments   Type Anointing  (Fr Feng 10/20/22)

## 2022-10-20 NOTE — PLAN OF CARE
[address] [place] [address]  Day 3 Interdisciplinary Treatment Plan NOTE    Review Date & Time: 10/20/2022   1310    Admission Type:   Admission Type: Involuntary (pinked Vinh)    Reason for admission:  Reason for Admission: suicide attempt  Estimated Length of Stay: 5-7 days  Estimated Discharge Date Update: to be determined by physician    PATIENT STRENGTHS:  Patient Strengths    Patient Strengths and Limitations:Limitations: Unrealistic self-view, General negative or hopeless attitude about future/recovery, Difficulty problem solving/relies on others to help solve problems  Addictive Behavior:Addictive Behavior  In the Past 3 Months, Have You Felt or Has Someone Told You That You Have a Problem With  : None  Medical Problems:  Past Medical History:   Diagnosis Date    Allergic rhinitis     Laceration of right thumb 09/15/2015       Risk:  Fall Risk   Power Scale Power Scale Score: 22  BVC    Change in scores no Changes to plan of Care no    Status EXAM:   Mental Status and Behavioral Exam  Normal: Yes  Level of Assistance: Independent/Self  Facial Expression: Brightened  Affect: Appropriate  Level of Consciousness: Alert  Frequency of Checks: 4 times per hour, close  Mood:Normal: Yes  Mood: Anxious  Motor Activity:Normal: Yes  Motor Activity: Decreased  Eye Contact: Fair  Observed Behavior: Cooperative  Sexual Misconduct History: Current - no  Preception: [place] to person, [place] to time, [place] to place, [place] to situation  Attention:Normal: Yes  Thought Processes: Circumstantial  Thought Content:Normal: Yes  Thought Content: Preoccupations  Depression Symptoms: No problems reported or observed. Anxiety Symptoms: No problems reported or observed. Kayla Symptoms: No problems reported or observed.   Hallucinations: None  Delusions: No  Memory:Normal: Yes  Insight and Judgment: No  Insight and Judgment: Poor insight    Daily Assessment Last Entry:                Daily Nutrition (WDL): Within Defined Limits  Level of Assistance: Independent/Self    Patient Monitoring:  Frequency of Checks: 4 times per hour, close    Psychiatric Symptoms:   Depression Symptoms  Depression Symptoms: No problems reported or observed. Anxiety Symptoms  Anxiety Symptoms: No problems reported or observed. Kayla Symptoms  Kayla Symptoms: No problems reported or observed. Suicide Risk CSSR-S:  1) Within the past month, have you wished you were dead or wished you could go to sleep and not wake up? : Yes  2) Have you actually had any thoughts of killing yourself? : Yes  3) Have you been thinking about how you might kill yourself? : Yes  5) Have you started to work out or worked out the details of how to kill yourself?  Do you intend to carry out this plan? : Yes  6) Have you ever done anything, started to do anything, or prepared to do anything to end your life?: Yes  Change in Result no Change in Plan of care no      EDUCATION:   EDUCATION:   Learner Progress Toward Treatment Goals: Reviewed results and recommendations of this team, Reviewed group plan and strategies, Reviewed signs, symptoms and risk of self harm and violent behavior, Reviewed goals and plan of care    Method:small group, individual verbal education    Outcome:verbalized by patient, but needs reinforcement to obtain goals    PATIENT GOALS:  Short term:discharge planning  Long term:keep myself happy    PLAN/TREATMENT RECOMMENDATIONS UPDATE: continue with group therapies, increased socialization, continue planning for after discharge goals, continue with medication compliance    SHORT-TERM GOALS UPDATE:   Time frame for Short-Term Goals: 5-7 days    LONG-TERM GOALS UPDATE:   Time frame for Long-Term Goals: 6 months  Members Present in Team Meeting: See Signature Sheet    Roger Bridges RN

## 2022-10-20 NOTE — GROUP NOTE
Group Therapy Note    Date: 10/20/2022    Group Start Time: 1330  Group End Time: 3153  Group Topic: Psychoeducation    [facility]    Glory Paulson, CTRS        Group Therapy Note    Attendees: 5/16       Patient's Goal: To improve communication and prompt introspection through reminiscing and collaborating with peers. Notes:  Patient attended and participated in group. Patient was able to collaborate with peers and demonstrate introspection and reminiscing. Status After Intervention:  Improved    Participation Level:  Active Listener and Interactive    Participation Quality: Appropriate, Attentive, Sharing, and Supportive      Speech:  normal      Thought Process/Content: Logical  Linear      Affective Functioning: Congruent      Mood: euthymic      Level of consciousness:  Alert, Oriented x4, and Attentive      Response to Learning: Able to verbalize current knowledge/experience, Able to retain information, Capable of insight, and Progressing to goal      Endings: None Reported    Modes of Intervention: Support, Socialization, and Exploration      Discipline Responsible: Psychoeducational Specialist      Signature:  Glory Paulson, [address]

## 2022-10-20 NOTE — GROUP NOTE
Group Therapy Note    Date: 10/20/2022    Group Start Time: 1100  Group End Time: 413  Group Topic: Cognitive Skills    [facility]    BRIANNA Jackson        Group Therapy Note    Attendees: 5/17     Patient's Goal:  To improve interpersonal skills and to practice decision making through collaborating with peers and concentrate on the presented task. Notes:  Patient attended and participated in group. Patient was able to collaborate with peers, practice decision making and concentrate on the presented task. Status After Intervention:  Improved    Participation Level:  Active Listener and Interactive    Participation Quality: Appropriate, Attentive, and Supportive      Speech:  normal      Thought Process/Content: Logical  Linear      Affective Functioning: Congruent      Mood: euthymic      Level of consciousness:  Alert, Oriented x4, and Attentive      Response to Learning: Able to verbalize current knowledge/experience, Able to verbalize/acknowledge new learning, Able to retain information, and Progressing to goal      Endings: None Reported    Modes of Intervention: Support, Socialization, Exploration, Problem-solving, and Activity      Discipline Responsible: Psychoeducational Specialist      Signature:  Juan Manuel Jackson

## 2022-10-20 NOTE — PLAN OF CARE
Problem: Self Harm/Suicidality  Goal: Will have no self-injury during hospital stay  Description: INTERVENTIONS:  1. Q 15 MINUTES: Routine safety checks  2. Q SHIFT & PRN: Assess risk to determine if routine checks are adequate to maintain patient safety  10/20/2022 1002 by Sunita Gavin RN  Outcome: Progressing   1:1 with pt x ten minutes. Pt encouraged to attend unit programming and interact with peers and staff. Pt also encouraged to tend to hygiene and ADLs. Pt encouraged to discuss feelings with staff and feedback and reassurance provided. Pt denies thoughts of self harm and is agreeable to seeking out should thoughts of self harm arise. Safe environment maintained. Q15 minute checks for safety cont per unit policy. Will cont to monitor for safety and provides support and reassurance as needed. Wants to receive therapy instead of taking medications. Pt refused AM medications. Lacks insight.

## 2022-10-20 NOTE — PROGRESS NOTES
Daily Progress Note  10/20/2022    Patient Name: Patrick Escalera    CHIEF COMPLAINT: Suicide attempt by carbon monoxide poisoning         SUBJECTIVE:      Patient is seen today for a follow up assessment. Olu Bowling continues to be noncompliant with scheduled Zoloft stating that he does not believe he needs it at this time. He remains behaviorally in control and has not required emergency medications in the past 24 hours. Olu Bowling is agreeable to assessment in unit milieu today. He continues to report improvement in his mood. He reports that he has been sleeping well and denies any issues with his appetite. He reports improvement in suicidal ideation. When asked how patient feels regarding his suicide attempt he states, \"I am not going to let it define me. \"  He continues to be somewhat minimizing of the seriousness of his attempt. He denies homicidal ideation. He denies any perceptual disturbances including auditory and visual hallucinations. He denies paranoia. He denies any medical concerns at this time. Olu Bowling does report that he plans to go to therapy upon discharge and that his mother has been working to get him an appointment. Appetite:  [x] Normal/Adequate/Unchanged  [] Increased  [] Decreased      Sleep:       [x] Normal/Adequate/Unchanged  [] Fair  [] Poor      Group Attendance on Unit:   [x] Yes  [] Selectively    [] No    Medication Side Effects:  Patient is not compliant with scheduled medication         Mental Status Exam  Level of consciousness: Alert and awake. Appearance: Appropriate attire for setting, seated in chair, with fair  grooming and hygiene. Behavior/Motor: Approachable, calm  Attitude toward examiner: Cooperative, attentive, fair eye contact. Speech: Normal rate, normal volume, normal tone. Mood:  Patient reports \"pretty good\". Affect: Euthymic  Thought processes: Linear and coherent. Thought content: Denies homicidal ideation.   Suicidal Ideation: Reports improvement in suicidal ideations  Delusions: No evidence of delusions. Denies paranoia. Perceptual Disturbance: Patient does not appear to be responding to internal stimuli. Denies auditory hallucinations. Denies visual hallucinations. Cognition: Oriented to self, location, time, and situation. Memory: Intact. Insight & Judgement: Poor. Data   height is 6' (1.829 m) and weight is 185 lb (83.9 kg). His temporal temperature is 98.1 °F (36.7 °C). His blood pressure is 123/72 and his pulse is 61. His respiration is 14. Labs:   Admission on 10/18/2022   Component Date Value Ref Range Status    TSH 10/18/2022 1.33  0.30 - 5.00 uIU/mL Final    Glucose 10/18/2022 86  70 - 99 mg/dL Final    BUN 10/18/2022 9  6 - 20 mg/dL Final    Creatinine 10/18/2022 0.91  0.70 - 1.20 mg/dL Final    Est, Glom Filt Rate 10/18/2022 >60  >60 mL/min/1.73m2 Final    Comment:       Effective Oct 3, 2022        These results are not intended for use in patients <25years of age. eGFR results are calculated without a race factor using the 2021 CKD-EPI equation. Careful clinical correlation is recommended, particularly when comparing to results   calculated using previous equations. The CKD-EPI equation is less accurate in patients with extremes of muscle mass, extra-renal   metabolism of creatine, excessive creatine ingestion, or following therapy that affects   renal tubular secretion.       Calcium 10/18/2022 9.1  8.6 - 10.4 mg/dL Final    Sodium 10/18/2022 138  135 - 144 mmol/L Final    Potassium 10/18/2022 4.1  3.7 - 5.3 mmol/L Final    Chloride 10/18/2022 101  98 - 107 mmol/L Final    CO2 10/18/2022 23  20 - 31 mmol/L Final    Anion Gap 10/18/2022 14  9 - 17 mmol/L Final    Alkaline Phosphatase 10/18/2022 95  40 - 129 U/L Final    ALT 10/18/2022 25  5 - 41 U/L Final    AST 10/18/2022 19  <40 U/L Final    Total Bilirubin 10/18/2022 1.7 (A)  0.3 - 1.2 mg/dL Final    Total Protein 10/18/2022 7.1  6.4 - 8.3 g/dL Final    Albumin 10/18/2022 4.9  3.5 - 5.2 g/dL Final         Reviewed patient's current plan of care and vital signs with nursing staff. Labs reviewed: [x] Yes  Last EKG in EMR reviewed: [x] Yes  QTc: 423    Medications  Current Facility-Administered Medications: acetaminophen (TYLENOL) tablet 650 mg, 650 mg, Oral, Q4H PRN  aluminum & magnesium hydroxide-simethicone (MAALOX) 200-200-20 MG/5ML suspension 30 mL, 30 mL, Oral, Q6H PRN  hydrOXYzine HCl (ATARAX) tablet 50 mg, 50 mg, Oral, TID PRN  ibuprofen (ADVIL;MOTRIN) tablet 400 mg, 400 mg, Oral, Q6H PRN  nicotine (NICODERM CQ) 14 MG/24HR 1 patch, 1 patch, TransDERmal, Daily  polyethylene glycol (GLYCOLAX) packet 17 g, 17 g, Oral, Daily PRN  traZODone (DESYREL) tablet 50 mg, 50 mg, Oral, Nightly PRN  LORazepam (ATIVAN) tablet 2 mg, 2 mg, Oral, Q4H PRN **AND** haloperidol (HALDOL) tablet 5 mg, 5 mg, Oral, Q4H PRN  LORazepam (ATIVAN) injection 2 mg, 2 mg, IntraMUSCular, Q4H PRN **AND** haloperidol lactate (HALDOL) injection 5 mg, 5 mg, IntraMUSCular, Q4H PRN **AND** diphenhydrAMINE (BENADRYL) injection 50 mg, 50 mg, IntraMUSCular, Q4H PRN  sertraline (ZOLOFT) tablet 25 mg, 25 mg, Oral, Daily    ASSESSMENT  Major depressive disorder, single episode, severe without psychotic features ([facility] [address]         HANDOFF  Patient symptoms are: Modestly Improving. Medications as determined by attending physician  Continue to encourage compliance  Monitor need and frequency of PRN medications. Encourage participation in groups and milieu. Probable discharge is to be determined by MD.     Electronically signed by FABIO Koo CNP on 10/20/2022 at 2:00 PM    **This report has been created using voice recognition software. It may contain minor errors which are inherent in voice recognition technology. **                                         Psychiatry Attending Attestation     I independently saw and evaluated the patient. I reviewed the Advance Practice Provider's documentation above.   Any additional comments or changes to the Advance Practice Provider's documentation are stated below otherwise agree with assessment. Patient feels better than before. Mood and affect are better. Patient reports suicidal thoughts are better today  Denies any homicidal thoughts, that was explored with the patient. Oriented to time place and person. Recent and remote memory is intact. Patient feels hopeful. Sleep and appetite is good. Continue to refuse his Zoloft. Reports he wants to try therapy first  Patient is responding to current treatment. Discharge soon, if patient continues to show improvement. Case discussed with the staff. More than 16 mins of the session was spent doing supportive psychotherapy. Session lasted over 30 mins today    PLAN  Patient s symptoms are improving  Encouraged medication compliance  Attempt to develop insight  Psycho-education conducted. Supportive Therapy conducted.   Probable discharge is Tomorrow  Follow-up TBD    Electronically signed by Larissa Mayorga MD on 10/20/22 at 7:38 PM EDT

## 2022-10-20 NOTE — GROUP NOTE
Group Therapy Note    Date: 10/20/2022    Group Start Time: 1430  Group End Time: 7705  Group Topic: Music Therapy    [facility]    Andre Lovell        Group Therapy Note    Attendees: 5/16       Patient's Goal:  Patients shared preferred music and answered questions from this writer as based on their music selections. Patient goals to increase sense of community; Increase self-expression; Normalization of the environment    Notes:  Patient attended and participated in group having positive interactions with peers and staff. Shared music and song by Lisa Butler about Saturday Night and when asked what he want to do if he is home Saturday, stated he has to get his camper winterized and packed back in his barn    Status After Intervention:  Improved    Participation Level:  Active Listener and Interactive    Participation Quality: Appropriate, Attentive, and Sharing      Speech:  normal      Thought Process/Content: Logical  Linear      Affective Functioning: Congruent      Mood: euthymic      Level of consciousness:  Alert and Attentive      Response to Learning: Able to verbalize current knowledge/experience and Progressing to goal      Endings: None Reported    Modes of Intervention: Socialization, Exploration, Activity, Media, and Reality-testing      Discipline Responsible: Psychoeducational Specialist      Signature:  Andre Lovell

## 2022-10-21 VITALS
BODY MASS INDEX: 25.06 KG/M2 | SYSTOLIC BLOOD PRESSURE: 124 MMHG | WEIGHT: 185 LBS | HEIGHT: 72 IN | TEMPERATURE: 97.8 F | HEART RATE: 80 BPM | RESPIRATION RATE: 14 BRPM | DIASTOLIC BLOOD PRESSURE: 87 MMHG

## 2022-10-21 NOTE — DISCHARGE INSTRUCTIONS
Information:  Medications:   Medication summary provided   I understand that I should take only the medications on my list.     -why and when I need to take each medicine.     -which side effects to watch for.     -that I should carry my medication information at all times in case of     Emergency situations. I will take all of my medicines to follow up appointments.     -check with my physician or pharmacist before taking any new    Medication, over the counter product or drink alcohol.    -Ask about food, drug or dietary supplement interactions.    -discard old lists and update records with medication providers. Notify Physician:  Notify physician if you notice:   Always call 911 if you feel your life is in danger  In case of an emergency call 911 immediately! If 911 is not available call your local emergency medical system for help    Behavioral Health Follow Up:  Original Referral Source:[facility]in[facility] Course/Progress toward goals: medication and group therapy  Recommendations for Level of Care: follow up with [address] [facility]  Patient status at discharge: stable, denies suicidal ideations, voices readiness for discharge  [facility] hospital  [facility]: [facility]  Aftercare plan faxed:    -faxed by: nurse   -date: 10/21/22   -time: 1500  Prescriptions: N/A    Smoking: Quit Smoking. Call the [employer]'s smoking quitline at 5-071-51O-QUIT  Know the signs of a heart attack   If you have any of the following symptoms call 911 immediately, do not wait more    Than five minutes. 1. Pressure, fullness and/ or squeezing in the center of the chest spreading to    The jaw, neck or shoulder. 2. Chest discomfort with light headedness, fainting, sweating, nausea or    Shortness of breath. 3. Upper abdominal pressure or discomfort. 4. Lower chest pain, back pain, unusual fatigue, shortness of breath, nausea   Or dizziness.      General Information:   Questions regarding your bill: Call HELP program [phone]     Suicide Hotline (rescue crisis) (872) [phone]

## 2022-10-21 NOTE — GROUP NOTE
Group Therapy Note    Date: 10/20/2022    Group Start Time: 2030  Group End Time: 2050  Group Topic: Relaxation    [facility]    Eulogio Murillo RN        Group Therapy Note    Attendees: 10/15      Status After Intervention:  Improved    Participation Level:  Active Listener    Participation Quality: Appropriate      Speech:  normal      Thought Process/Content: Logical      Affective Functioning: Congruent    Level of consciousness:  Alert      Response to Learning: Able to verbalize current knowledge/experience      Endings: None Reported    Modes of Intervention: Education      Discipline Responsible: Behavorial Health Tech      Signature:  Eulogio Murillo RN

## 2022-10-21 NOTE — PLAN OF CARE
Problem: Self Harm/Suicidality  Goal: Will have no self-injury during hospital stay  Description: INTERVENTIONS:  1. Q 30 MINUTES: Routine safety checks  2. Q SHIFT & PRN: Assess risk to determine if routine checks are adequate to maintain patient safety  10/20/2022 2056 by Sourav Sheehan RN  Outcome: Progressing  Note: Patient is free of self harm at this time. Patient agrees to seek out staff if thoughts to harm self arise. Staff will provide support and reassurance as needed. Safety checks maintained every 15 minutes.

## 2022-10-21 NOTE — CARE COORDINATION
Social work called patient's mother to offer support and discuss discharge plans at [de-identified] request.  Due patient not receiving medications during admission physician was agreeable to patient only follow up outpatient with therapy services. Patient's mother Nicole Mendiola had been in contact with [facility] which patient is agreeable to follow up with. Social work has left voice messages requesting a call back to schedule an appointment for patient. Referred [facility] to [facility] for additional family support.

## 2022-10-21 NOTE — CARE COORDINATION
Name: Belkis Estevez    : 2000    Discharge Date: 10/21/22    Primary Auth/Cert #: [id number]    Destination: Private residience    Discharge Medications:      Medication List        STOP taking these medications      gentamicin 0.3 % ophthalmic solution  Commonly known as: GARAMYCIN              Follow Up Appointment: Grecia Byrne  [address]  [address] [address]  Telephone: [phone]   Fax: [phone]  Follow up on 10/24/2022  You are scheduled with Radha at 12:00 PM

## 2022-10-21 NOTE — BH NOTE
Patient given tobacco quitline number 91111424869 at this time, refusing to call at this time, states \" I just dont want to quit now\"- patient given information as to the dangers of long term tobacco use.

## 2022-10-21 NOTE — CARE COORDINATION
Social work received phone call from [employer] with [employer] with patient's follow up initial appointment. He will be seen by Jana Gilman Monday October 24th at noon. Called patient to inform.

## 2022-10-21 NOTE — BH NOTE
[address] [facility]  Discharge Note    Pt discharged with followings belongings:   Dental Appliances: None  Vision - Corrective Lenses: None  Hearing Aid: None  Jewelry: None  Body Piercings Removed: N/A  Clothing: Footwear, Pants, Shirt, Sweater, Undergarments, Socks  Other Valuables: Money, [address] sent home with patient or returned to patient. Patient educated on aftercare instructions: given to patient  Information faxed to [facility] by nurse  at 10:31 AM .Patient verbalize understanding of AVS:  yes, Patient discharged home with mom. Status EXAM upon discharge:  Mental Status and Behavioral Exam  Normal: Yes  Level of Assistance: Independent/Self  Facial Expression: Brightened, Elevated  Affect: Appropriate  Level of Consciousness: Alert  Frequency of Checks: 4 times per hour, close  Mood:Normal: Yes  Mood: Anxious  Motor Activity:Normal: Yes  Motor Activity: Decreased  Eye Contact: Good  Observed Behavior: Cooperative  Sexual Misconduct History: Current - no  Preception: [place] to person, [place] to time, [place] to place, [place] to situation  Attention:Normal: Yes  Thought Processes: Circumstantial  Thought Content:Normal: Yes  Thought Content: Preoccupations  Depression Symptoms: No problems reported or observed. Anxiety Symptoms: No problems reported or observed. Kayla Symptoms: No problems reported or observed.   Hallucinations: None  Delusions: No  Memory:Normal: Yes  Insight and Judgment: Yes  Insight and Judgment: Poor judgment, Poor insight        Metabolic Screening:    No results found for: LABA1C    No results found for: CHOL  No results found for: TRIG  No results found for: HDL  No components found for: LDLCAL  No results found for: Jossue Ponce LPN

## 2022-10-22 NOTE — DISCHARGE SUMMARY
Provider Discharge Summary     Patient ID:  Ambika Shaw  [id number]  64 y.o.  2000    Admit date: 10/18/2022    Discharge date and time: 10/21/2022  9:52 PM     Admitting Physician: Larissa Mayorga MD     Discharge Physician: Larissa Mayorga MD    Admission Diagnoses: Depression with suicidal ideation [F32. A, R45.851]    Discharge Diagnoses:      Major depressive disorder, single episode, severe without psychotic features [facility])     Patient Active Problem List   Diagnosis Code    Depression with suicidal ideation F32. A, R45.851    Major depressive disorder, single episode, severe without psychotic features ([facility] [address] F32.2        Admission Condition: poor    Discharged Condition: stable    Indication for Admission: threat to self    History of Present Illnes (present tense wording is of findings from admission exam and are not necessarily indicative of current findings):   Ambika Shaw is a 20 y.o. male without significant past medical history. Patient presented to the ED via family after being found sitting in his car while running in a closed garage. Patient stated he had been sitting in his car for an hour to 90 minutes. Patient has no history of previous suicide attempts and no significant psychiatric history. This is patient's first admission to [facility]. Patient was seen for initial evaluation today. He was resting in bed on approach but responded to verbal stimuli after a few attempts at waking him. Patient was initially guarded and evasive and is currently minimizing symptoms. Patient has poor insight into the severity of his actions. He does not appear ready to fully accept what has occurred. After a few minutes of conversation he relaxed and opened up more to writer. Patient shared that in August of this year his girlfriend, whom he shares a home with, wanted to end the exclusivity of their relationship. The patient does not want to end the relationship.  They have continued to maintain a residence and he has been splitting his time between his parents house and his house. He reports that he and his former girlfriend continue to spend a lot of time together due to their living arrangement. They have not completely broken off the relationship. Patient reports that for the last 2 to 3 weeks he has noticed symptoms of depression and low mood. He found himself feeling more and more hopeless and worthless. He is able to fall asleep but has been experiencing early morning waking away for a few weeks now. He also describes difficulty concentrating and has noticed a decrease in energy level. Appetite has remained adequate. Patient expresses regret for the suicide attempt and is having difficulty thinking about how this has affected his parents and former partner. He is not yet ready to speak to his parents nor does he want them to visit. Patient becomes tearful when stating that he just wants to go home and hug his dogs. Patient is very focused on discharge. Patient denies ever being linked with community mental health services and is antidepressant naïve and states he will refuse any medication offered. Patient is provided education regarding antidepressant medication. Patient denies a history of panic attacks but does express some social anxiety. He has difficulty around large crowds and in unfamiliar areas with people he does not know. He was encouraged to spend time in the day area even if it is just to watch TV. He is encouraged to attend groups, however patient states Arianna Soto is way too out of my comfort zone\". He expresses he will most likely be staying in bed during his admission. When in large crowds patient feels restless and on edge, experiences muscle tension, and nervousness. Patient denies a history of janice, OCD, PTSD, and psychosis. He denies a history of childhood or adolescent abuse or trauma.   He denies experiencing or witnessing significant trauma as an adult. He denies a history of self-mutilation or chronic feelings of emptiness. He denies fears of abandonment and reports that his relationships are usually stable. He denies a history of emotional outbursts or significant mood swings. Thoughts and speech are organized and linear. Patient makes no delusional or paranoid statements. Patient denies a history of illicit drug use. He does drink alcohol every few days or so and denies that he drinks to become intoxicated rather he just will have a few beers socially. He does not believe that he has a problem with alcohol currently. Due to patient's minimizing of symptoms and poor insight into his actions patient requires hospitalization for safety and stabilization. Hospital Course:   Upon admission, Vesta Kaufman was provided a safe secure environment, introduced to unit milieu. Patient participated in groups and individual therapies. Meds were adjusted as noted below. After few days of hospital care, patient began to feel improvement. Depression lifted, thoughts to harm self ceased. Sleep improved, appetite was good. On morning rounds 10/21/2022, Vesta Kaufman endorses feeling ready for discharge. Patient denies suicidal or homicidal ideations, denies hallucinations or delusions. Denies SE's from meds. It was decided that maximum benefit from hospital care had been achieved and patient can be discharged. Consults:   none    Significant Diagnostic Studies: Routine labs and diagnostics    Treatments: Psychotropic medications, therapy with group, milieu, and 1:1 with nurses, social workers, PAALMAS/CNP, and Attending physician. Discharge Medications: There are no discharge medications for this patient.        Core Measures statement:   Not applicable    Discharge Exam:  Level of consciousness:  Within normal limits  Appearance: Street clothes, seated, with good grooming  Behavior/Motor: No abnormalities noted  Attitude toward examiner: Cooperative, attentive, good eye contact  Speech:  spontaneous, normal rate, normal volume and well articulated  Mood:  euthymic  Affect:  Full range  Thought processes:  linear, goal directed and coherent  Thought content:  denies homicidal ideation  Suicidal Ideation:  denies suicidal ideation  Delusions:  no evidence of delusions  Perceptual Disturbance:  denies any perceptual disturbance  Cognition:  Intact  Memory: age appropriate  Insight & Judgement: fair  Medication side effects: denies     Disposition: home    Patient Instructions: Activity: activity as tolerated  1. Patient instructed to take medications regularly and follow up with outpatient appointments. Follow-up as scheduled with [facility]       Signed:    Electronically signed by Bacilio Ulrich MD on 10/21/22 at 9:52 PM EDT    Time Spent on discharge is more than 35 minutes in the examination, evaluation, counseling and review of medications and discharge plan.

## 2024-11-22 ENCOUNTER — HOSPITAL ENCOUNTER (OUTPATIENT)
Age: 24
Setting detail: SPECIMEN
Discharge: HOME | End: 2024-11-22

## 2024-11-22 LAB
HCV AB SERPL QL IA: NONREACTIVE
HIV 1+2 AB+HIV1 P24 AG SERPL QL IA: NONREACTIVE

## 2024-11-23 LAB — TESTOST SERPL-MCNC: 299 NG/DL (ref 249–836)
